# Patient Record
Sex: FEMALE | Race: WHITE
[De-identification: names, ages, dates, MRNs, and addresses within clinical notes are randomized per-mention and may not be internally consistent; named-entity substitution may affect disease eponyms.]

---

## 2019-10-10 NOTE — MMO
Bilateral MAMMO Bilat Screen [facility].

 

CLINICAL HISTORY:

Patient is 74 years old and is seen for screening. The patient has the following

family history of breast cancer:  sister.

 

VIEWS:

The views performed were:  bilateral craniocaudal with tomosynthesis and

bilateral mediolateral oblique with tomosynthesis.

 

FILMS COMPARED:

The present examination has been compared to prior imaging studies performed at

[facility] on 03/19/2012 and 05/20/2015, and at

[facility] on 04/02/2010.

 

This study has been interpreted with the assistance of computer-aided detection.

 

MAMMOGRAM FINDINGS:

There are scattered fibroglandular densities.

 

Benign calcifications are noted bilaterally.  Left biopsy clip.

 

There are no suspicious masses, suspicious calcifications, or new areas of

architectural distortion.

 

IMPRESSION:

THERE IS NO MAMMOGRAPHIC EVIDENCE OF MALIGNANCY.

 

A ROUTINE FOLLOW-UP MAMMOGRAM IN 1 YEAR IS RECOMMENDED.

 

THE RESULTS OF THIS EXAM WERE SENT TO THE PATIENT.

 

ACR BI-RADS Category 2 - Benign finding

 

MAMMOGRAPHY NOTE:

 1. A negative mammogram report should not delay a biopsy if a dominant of

 clinically suspicious mass is present.

 2. Approximately 10% to 15% of breast cancers are not detected by

 mammography.

 3. Adenosis and dense breasts may obscure an underlying neoplasm.

 

 

Reported by: FLOYD ERVIN MD

Electonically Signed: [id number]

## 2019-12-12 NOTE — RAD
XR Chest Pa   Lat STANDARD



HISTORY: Cough



COMPARISON: 3/22/2017



FINDINGS: The heart size is normal. The lungs are well expanded without focal areas of consolidation,
 pneumothorax or pleural effusions.



IMPRESSION: No radiographic evidence of acute cardiopulmonary process.



Reported By: Von Wright 

Electronically Signed:  12/12/2019 11:28 AM

## 2021-09-14 ENCOUNTER — HOSPITAL ENCOUNTER (OUTPATIENT)
Dept: HOSPITAL 92 - BICMAMMO | Age: 76
Discharge: HOME | End: 2021-09-14
Attending: OBSTETRICS & GYNECOLOGY
Payer: MEDICARE

## 2021-09-14 DIAGNOSIS — Z98.890: ICD-10-CM

## 2021-09-14 DIAGNOSIS — Z80.3: ICD-10-CM

## 2021-09-14 DIAGNOSIS — Z12.31: Primary | ICD-10-CM

## 2021-09-14 PROCEDURE — 77067 SCR MAMMO BI INCL CAD: CPT

## 2021-09-14 PROCEDURE — 77063 BREAST TOMOSYNTHESIS BI: CPT

## 2022-12-10 ENCOUNTER — HOSPITAL ENCOUNTER (EMERGENCY)
Dept: HOSPITAL 92 - ERS | Age: 77
Discharge: HOME | End: 2022-12-10
Payer: MEDICARE

## 2022-12-10 DIAGNOSIS — I11.0: ICD-10-CM

## 2022-12-10 DIAGNOSIS — U07.1: Primary | ICD-10-CM

## 2022-12-10 DIAGNOSIS — Z79.899: ICD-10-CM

## 2022-12-10 DIAGNOSIS — Z79.01: ICD-10-CM

## 2022-12-10 DIAGNOSIS — I50.9: ICD-10-CM

## 2022-12-10 DIAGNOSIS — E78.5: ICD-10-CM

## 2022-12-10 LAB
ALBUMIN SERPL BCG-MCNC: 4.1 G/DL (ref 3.4–4.8)
ALP SERPL-CCNC: 73 U/L (ref 40–110)
ALT SERPL W P-5'-P-CCNC: 12 U/L (ref 8–55)
ANION GAP SERPL CALC-SCNC: 13 MMOL/L (ref 10–20)
AST SERPL-CCNC: 15 U/L (ref 5–34)
BILIRUB SERPL-MCNC: 0.8 MG/DL (ref 0.2–1.2)
BUN SERPL-MCNC: 20 MG/DL (ref 9.8–20.1)
CALCIUM SERPL-MCNC: 8.5 MG/DL (ref 7.8–10.44)
CHLORIDE SERPL-SCNC: 101 MMOL/L (ref 98–107)
CO2 SERPL-SCNC: 27 MMOL/L (ref 23–31)
CREAT CL PREDICTED SERPL C-G-VRATE: 0 ML/MIN (ref 70–130)
GLOBULIN SER CALC-MCNC: 2.9 G/DL (ref 2.4–3.5)
GLUCOSE SERPL-MCNC: 103 MG/DL (ref 83–110)
HGB BLD-MCNC: 12.5 G/DL (ref 12–16)
MCH RBC QN AUTO: 33.7 PG (ref 27–31)
MCV RBC AUTO: 101 FL (ref 78–98)
MDIFF COMPLETE?: YES
PLATELET # BLD AUTO: 235 10X3/UL (ref 130–400)
POTASSIUM SERPL-SCNC: 3.6 MMOL/L (ref 3.5–5.1)
RBC # BLD AUTO: 3.71 MILL/UL (ref 4.2–5.4)
SODIUM SERPL-SCNC: 137 MMOL/L (ref 136–145)
WBC # BLD AUTO: 9 10X3/UL (ref 4.8–10.8)

## 2022-12-10 PROCEDURE — 71045 X-RAY EXAM CHEST 1 VIEW: CPT

## 2022-12-10 PROCEDURE — 93005 ELECTROCARDIOGRAM TRACING: CPT

## 2022-12-10 PROCEDURE — 84484 ASSAY OF TROPONIN QUANT: CPT

## 2022-12-10 PROCEDURE — 36415 COLL VENOUS BLD VENIPUNCTURE: CPT

## 2022-12-10 PROCEDURE — 80053 COMPREHEN METABOLIC PANEL: CPT

## 2022-12-10 PROCEDURE — 83880 ASSAY OF NATRIURETIC PEPTIDE: CPT

## 2022-12-10 PROCEDURE — 85025 COMPLETE CBC W/AUTO DIFF WBC: CPT
